# Patient Record
Sex: MALE | URBAN - METROPOLITAN AREA
[De-identification: names, ages, dates, MRNs, and addresses within clinical notes are randomized per-mention and may not be internally consistent; named-entity substitution may affect disease eponyms.]

---

## 2021-09-02 ENCOUNTER — OFFICE VISIT (OUTPATIENT)
Dept: ORTHOPEDIC SURGERY | Age: 31
End: 2021-09-02

## 2021-09-02 VITALS
HEART RATE: 55 BPM | BODY MASS INDEX: 26.87 KG/M2 | HEIGHT: 72 IN | OXYGEN SATURATION: 96 % | WEIGHT: 198.4 LBS | RESPIRATION RATE: 17 BRPM

## 2021-09-02 DIAGNOSIS — M99.08 RIB CAGE REGION SOMATIC DYSFUNCTION: ICD-10-CM

## 2021-09-02 DIAGNOSIS — M99.03 LUMBAR REGION SOMATIC DYSFUNCTION: ICD-10-CM

## 2021-09-02 DIAGNOSIS — M99.05 PELVIC SOMATIC DYSFUNCTION: ICD-10-CM

## 2021-09-02 DIAGNOSIS — M99.04 SACRAL REGION SOMATIC DYSFUNCTION: ICD-10-CM

## 2021-09-02 DIAGNOSIS — M99.01 CERVICAL SOMATIC DYSFUNCTION: ICD-10-CM

## 2021-09-02 DIAGNOSIS — R07.81 RIB PAIN ON LEFT SIDE: Primary | ICD-10-CM

## 2021-09-02 DIAGNOSIS — M99.02 THORACIC REGION SOMATIC DYSFUNCTION: ICD-10-CM

## 2021-09-02 PROCEDURE — 98927 OSTEOPATH MANJ 5-6 REGIONS: CPT | Performed by: FAMILY MEDICINE

## 2021-09-02 PROCEDURE — 99203 OFFICE O/P NEW LOW 30 MIN: CPT | Performed by: FAMILY MEDICINE

## 2021-09-02 NOTE — PATIENT INSTRUCTIONS
Preform home exercises daily. Return if symptoms worsen or fail to improve.   Search YouTube for my channel:    Dr. Eryn Hernández back/Piriformis    Neck/Upper back

## 2021-09-02 NOTE — PROGRESS NOTES
HISTORY OF PRESENT ILLNESS    Za Dillard 1990 is a 33y.o. year old male comes in today as new patient for: left rib pain    Patients symptoms have been present for about a month. Pain level 4/10 posterior left ribs. It has worsened with twisting. Patient has tried:  eval by friend who is [facility] who suspected rib alignment issue and some stretch with mild benefit. It is described as pain started after throwing kids in to pool 50 or so times. History reviewed. No pertinent surgical history. Social History     Socioeconomic History    Marital status: UNKNOWN     Spouse name: Not on file    Number of children: Not on file    Years of education: Not on file    Highest education level: Not on file   Tobacco Use    Smoking status: Never Smoker    Smokeless tobacco: Never Used   Vaping Use    Vaping Use: Never used   Substance and Sexual Activity    Alcohol use: Yes     Alcohol/week: 4.0 standard drinks     Types: 2 Glasses of wine, 2 Cans of beer per week    Drug use: Not Currently     Social Determinants of Health     Financial Resource Strain:     Difficulty of Paying Living Expenses:    Food Insecurity:     Worried About Running Out of Food in the Last Year:     [address] in the Last Year:    Transportation Needs:     Lack of Transportation (Medical):  Lack of Transportation (Non-Medical):    Physical Activity:     Days of Exercise per Week:     Minutes of Exercise per Session:    Stress:     Feeling of Stress :    Social Connections:     Frequency of Communication with Friends and Family:     Frequency of Social Gatherings with Friends and Family:     Attends Religion Services:     Active Member of Clubs or Organizations:     Attends Club or Organization Meetings:     Marital Status:         Past Medical History:   Diagnosis Date    Arthritis     left hip     No family history on file.       ROS:  No SOB, numbness      Objective:  Pulse (!) 55   Resp 17   Ht 6' (1.829 m) Wt 198 lb 6.4 oz (90 kg)   SpO2 96%   BMI 26.91 kg/m²   NEURO:  Sensation intact to light touch. Reflexes +1/4 biceps, triceps, patellar and Achilles bilaterally. M/S:  Examined seated and supine. Slump negative. Standing flexion test positive left  Sphinx test positive left. ASIS low left  Iliac crests equal bilaterally Pubes equal bilaterally Medial malleolus low left  Sacral base posterior left  POONAM low left  TTA at C2 on left \ worse flexion, T4, 5, 7, 8 on left worse flexion and L3 on right worse flexion Rib(s) 5, 6, 7, left TTP and posterior LE Strength +5/5 bilaterally        Assessment/Plan:     ICD-10-CM ICD-9-CM    1. Rib pain on left side  R07.81 786.50    2. Lumbar region somatic dysfunction  M99.03 739.3 GA OSTEOPATHIC MANIP,5-6 BODY REGN   3. Pelvic somatic dysfunction  M99.05 739.5 GA OSTEOPATHIC MANIP,5-6 BODY REGN   4. Sacral region somatic dysfunction  M99.04 739.4 GA OSTEOPATHIC MANIP,5-6 BODY REGN   5. Cervical somatic dysfunction  M99.01 739.1 GA OSTEOPATHIC MANIP,5-6 BODY REGN   6. Rib cage region somatic dysfunction  M99.08 739.8 GA OSTEOPATHIC MANIP,5-6 BODY REGN   7. Thoracic region somatic dysfunction  M99.02 739.2 GA OSTEOPATHIC MANIP,5-6 BODY REGN       Patient (or guardian if minor) verbalizes understanding of evaluation and plan. Verbal consent obtained. Cervical, Thoracic, Rib, Lumbar, Pelvic and Sacral SD treated with ME and HVLA. Correction of previous malalignments verified after Tx. Pt tolerated well. Notes improvement of Sx and pain is now rated 0/10. HEP/stretches daily. Discussed stretching as demo and RTC as needed.

## 2024-03-15 ENCOUNTER — OFFICE VISIT (OUTPATIENT)
Age: 34
End: 2024-03-15
Payer: OTHER GOVERNMENT

## 2024-03-15 VITALS — HEIGHT: 72 IN | WEIGHT: 191 LBS | BODY MASS INDEX: 25.87 KG/M2

## 2024-03-15 DIAGNOSIS — M65.832 EXTENSOR TENOSYNOVITIS OF LEFT WRIST: ICD-10-CM

## 2024-03-15 DIAGNOSIS — M25.532 LEFT WRIST PAIN: ICD-10-CM

## 2024-03-15 DIAGNOSIS — M67.432 GANGLION CYST OF DORSUM OF LEFT WRIST: Primary | ICD-10-CM

## 2024-03-15 PROCEDURE — 99213 OFFICE O/P EST LOW 20 MIN: CPT | Performed by: ORTHOPAEDIC SURGERY

## 2024-03-15 PROCEDURE — 73110 X-RAY EXAM OF WRIST: CPT | Performed by: ORTHOPAEDIC SURGERY

## 2024-03-15 RX ORDER — ASCORBIC ACID 100 MG
TABLET,CHEWABLE ORAL
COMMUNITY

## 2024-03-15 NOTE — PROGRESS NOTES
the dorsal aspect of the left wrist.  This is nonspecific and may represent an intra-articular ganglion versus other cause of inflammation.    Tenderness L R Test L R   1st Ext Comp - - Finkelstein's - -   Snuff Box - - Brown - -   2nd Ext Comp - - S-L Shear - -   S-L Joint - - L-T Shear - -   L-T Joint - - DRUJ Sup - -   6th Ext Comp - - DRUJ Pro - -   Ulnar Snuff - - DRUJ Grind - -   Fovea - - TFCC - -   STT Joint - - Mid-Carp Inst - -   FCR - - P-T Grind - -   Intersection - - ECU Sublux. - -      Dorsal Ganglion: ? left   Volar Ganglion: -      ROM: Full        Imaging:     3/15/2024 3 views of left wrist does not show any fracture, dislocation, or any other acute or chronic osseous abnormalities.      Impression     Diagnosis Orders   1. Ganglion cyst of dorsum of left wrist  MRI WRIST LEFT WO CONTRAST      2. Extensor tenosynovitis of left wrist  MRI WRIST LEFT WO CONTRAST      3. Left wrist pain  [11598] Wrist 3V    MRI WRIST LEFT WO CONTRAST            Plan:     MRI of left wrist without contrast to evaluate for intra-articular ganglion cyst versus other cause of inflammation in the wrist or even extensor tenosynovitis.    Return for MRI Review.     Plan was reviewed with patient, who verbalized agreement and understanding of the plan    Note: This note was completed using voice recognition software.  Any typographical/name errors or mistakes are unintentional.

## 2024-04-12 ENCOUNTER — HOSPITAL ENCOUNTER (OUTPATIENT)
Facility: HOSPITAL | Age: 34
Discharge: HOME | End: 2024-04-12
Attending: ORTHOPAEDIC SURGERY
Payer: OTHER GOVERNMENT

## 2024-04-12 DIAGNOSIS — M67.432 GANGLION CYST OF DORSUM OF LEFT WRIST: ICD-10-CM

## 2024-04-12 DIAGNOSIS — M65.832 EXTENSOR TENOSYNOVITIS OF LEFT WRIST: ICD-10-CM

## 2024-04-12 DIAGNOSIS — M25.532 LEFT WRIST PAIN: ICD-10-CM

## 2024-04-12 PROCEDURE — 73221 MRI JOINT UPR EXTREM W/O DYE: CPT

## 2024-05-10 ENCOUNTER — OFFICE VISIT (OUTPATIENT)
Age: 34
End: 2024-05-10
Payer: OTHER GOVERNMENT

## 2024-05-10 VITALS — HEIGHT: 72 IN | BODY MASS INDEX: 26.01 KG/M2 | WEIGHT: 192 LBS

## 2024-05-10 DIAGNOSIS — M67.432 GANGLION CYST OF DORSUM OF LEFT WRIST: Primary | ICD-10-CM

## 2024-05-10 DIAGNOSIS — M65.832 EXTENSOR TENOSYNOVITIS OF LEFT WRIST: ICD-10-CM

## 2024-05-10 PROCEDURE — 99214 OFFICE O/P EST MOD 30 MIN: CPT | Performed by: ORTHOPAEDIC SURGERY

## 2024-05-10 NOTE — PROGRESS NOTES
Luis Albarado is a 33 y.o. male right handed [employer] .  Worker's Compensation and legal considerations: none    Chief Complaint   Patient presents with    Wrist Pain     Left       Pain Score:   0 - No pain    HPI: Patient presents today for follow-up of left wrist MRI.  He reports his symptoms to be unchanged since previous visit.  He is concerned about proceeding with surgery at this time given the fact that he has a PT test that is to be done by October.    Initial HPI: Patient presents today with complaints of left wrist pain.  He localizes it to the dorsum of the wrist.    Date of onset: Chronic  Injury: No  Prior Treatment:  No    ROS: Review of Systems - General ROS: negative except HPI    Past Medical History:   Diagnosis Date    Arthritis     left hip       History reviewed. No pertinent surgical history.     Current Outpatient Medications   Medication Sig Dispense Refill    Ascorbic Acid (VITAMIN C) 100 MG CHEW Vitamin C      Docusate Calcium (STOOL SOFTENER PO) Stool Softener      Ferrous Sulfate (IRON) 28 MG TABS Take 1 tablet by mouth Every Day       No current facility-administered medications for this visit.       No Known Allergies      Ht 1.829 m (6')   Wt 87.1 kg (192 lb)   BMI 26.04 kg/m²   Physical Exam  Constitutional:       General: He is not in acute distress.     Appearance: Normal appearance. He is not ill-appearing.   Cardiovascular:      Pulses: Normal pulses.   Pulmonary:      Effort: Pulmonary effort is normal. No respiratory distress.   Abdominal:      General: Abdomen is flat. There is no distension.   Musculoskeletal:         General: Tenderness present. No swelling, deformity or signs of injury. Normal range of motion.      Cervical back: Normal range of motion and neck supple.      Right lower leg: No edema.      Left lower leg: No edema.   Skin:     General: Skin is warm and dry.      Capillary Refill: Capillary refill takes less than 2 seconds.      Findings: No bruising or

## 2024-09-06 ENCOUNTER — OFFICE VISIT (OUTPATIENT)
Age: 34
End: 2024-09-06
Payer: OTHER GOVERNMENT

## 2024-09-06 VITALS
BODY MASS INDEX: 26.93 KG/M2 | WEIGHT: 198.8 LBS | SYSTOLIC BLOOD PRESSURE: 122 MMHG | DIASTOLIC BLOOD PRESSURE: 79 MMHG | HEIGHT: 72 IN

## 2024-09-06 DIAGNOSIS — M67.432 GANGLION CYST OF DORSUM OF LEFT WRIST: Primary | ICD-10-CM

## 2024-09-06 PROCEDURE — 99214 OFFICE O/P EST MOD 30 MIN: CPT | Performed by: ORTHOPAEDIC SURGERY

## 2024-09-06 NOTE — PROGRESS NOTES
Eyes:      Extraocular Movements: Extraocular movements intact.      Pupils: Pupils are equal, round, and reactive to light.   Cardiovascular:      Pulses: Normal pulses.   Pulmonary:      Effort: Pulmonary effort is normal. No respiratory distress.   Abdominal:      General: Abdomen is flat. There is no distension.   Musculoskeletal:         General: No swelling, tenderness, deformity or signs of injury. Normal range of motion.      Cervical back: Normal range of motion and neck supple.      Right lower leg: No edema.      Left lower leg: No edema.   Skin:     General: Skin is warm and dry.      Capillary Refill: Capillary refill takes less than 2 seconds.      Findings: No bruising or erythema.   Neurological:      General: No focal deficit present.      Mental Status: He is alert and oriented to person, place, and time.   Psychiatric:         Mood and Affect: Mood normal.         Behavior: Behavior normal.          Wrist: nontender today    Tenderness L R Test L R   1st Ext Comp - - Finkelstein's - -   Snuff Box - - Brown - -   2nd Ext Comp - - S-L Shear - -   S-L Joint - - L-T Shear - -   L-T Joint - - DRUJ Sup - -   6th Ext Comp - - DRUJ Pro - -   Ulnar Snuff - - DRUJ Grind - -   Fovea - - TFCC - -   STT Joint - - Mid-Carp Inst - -   FCR - - P-T Grind - -   Intersection - - ECU Sublux. - -      Dorsal Ganglion: 1cm left   Volar Ganglion: -      ROM: Full        Imaging:     MRI WRIST LEFT WO CONTRAST 04/12/2024  Impression  1. Dorsal ganglion cyst at the articulation of the scapholunate and capitate  measuring 5 x 4 x 6 mm.  2. Minimal second extensor compartment tenosynovitis.  3. Question minimal tendinosis of the first extensor and 6 extensor compartments  and of the flexor digitorum.      3/15/2024 3 views of left wrist does not show any fracture, dislocation, or any other acute or chronic osseous abnormalities.      Impression     Diagnosis Orders   1. Ganglion cyst of dorsum of left wrist  SCHEDULE

## 2024-10-23 ENCOUNTER — HOSPITAL ENCOUNTER (OUTPATIENT)
Facility: HOSPITAL | Age: 34
Setting detail: SPECIMEN
Discharge: HOME | End: 2024-10-26
Payer: OTHER GOVERNMENT

## 2024-10-23 DIAGNOSIS — Z98.890 S/P EXCISION OF GANGLION CYST: ICD-10-CM

## 2024-10-23 DIAGNOSIS — M67.432 GANGLION CYST OF DORSUM OF LEFT WRIST: Primary | ICD-10-CM

## 2024-10-23 PROCEDURE — 88304 TISSUE EXAM BY PATHOLOGIST: CPT

## 2024-10-23 RX ORDER — DICLOFENAC SODIUM 75 MG/1
75 TABLET, DELAYED RELEASE ORAL EVERY 12 HOURS PRN
Qty: 20 TABLET | Refills: 0 | Status: SHIPPED | OUTPATIENT
Start: 2024-10-23 | End: 2024-11-02

## 2024-10-23 RX ORDER — HYDROCODONE BITARTRATE AND ACETAMINOPHEN 5; 325 MG/1; MG/1
1 TABLET ORAL EVERY 6 HOURS PRN
Qty: 12 TABLET | Refills: 0 | Status: SHIPPED | OUTPATIENT
Start: 2024-10-23 | End: 2024-10-26

## 2024-11-01 ENCOUNTER — OFFICE VISIT (OUTPATIENT)
Age: 34
End: 2024-11-01

## 2024-11-01 VITALS — HEIGHT: 72 IN | WEIGHT: 198 LBS | BODY MASS INDEX: 26.82 KG/M2

## 2024-11-01 DIAGNOSIS — Z98.890 POST-OPERATIVE STATE: ICD-10-CM

## 2024-11-01 DIAGNOSIS — M67.432 GANGLION CYST OF DORSUM OF LEFT WRIST: Primary | ICD-10-CM

## 2024-11-01 DIAGNOSIS — Z98.890 S/P EXCISION OF GANGLION CYST: ICD-10-CM

## 2024-11-01 PROCEDURE — 99024 POSTOP FOLLOW-UP VISIT: CPT

## 2024-11-01 NOTE — PROGRESS NOTES
FINAL DIAGNOSIS:     LEFT WRIST GANGLION CYST, EXCISION:        GANGLION CYST.     Impression     Diagnosis Orders   1. Ganglion cyst of dorsum of left wrist        2. S/P excision of ganglion cyst        3. Post-operative state                  Plan:     Wound care reviewed - patient is to wash and dry the incisional area(s) with plain, antibacterial soap and water at least twice daily and pat dry with a clean paper towel.     Cover with a band-aid as needed if out of the house or doing potentially dirty tasks. Ok to leave open to air while at home resting.    Once incision appears more healed, begin using scar cream or cocoa butter to massage directly into the scar to combat scar tissue formation.    If the wound appears red, hot, begins to drain or smell bad, please call the office or seek care at urgent care/ED.    Continue to work on range of motion exercises.    Activities as tolerated    Return if symptoms worsen or fail to improve.     Plan was reviewed with patient, who verbalized agreement and understanding of the plan    Note: This note was completed using voice recognition software.  Any typographical/name errors or mistakes are unintentional.

## 2025-03-14 ENCOUNTER — OFFICE VISIT (OUTPATIENT)
Age: 35
End: 2025-03-14

## 2025-03-14 VITALS — WEIGHT: 198 LBS | HEIGHT: 72 IN | BODY MASS INDEX: 26.82 KG/M2

## 2025-03-14 DIAGNOSIS — M24.632: Primary | ICD-10-CM

## 2025-03-14 DIAGNOSIS — Z98.890 H/O EXCISION OF GANGLION CYST: ICD-10-CM

## 2025-03-14 NOTE — PROGRESS NOTES
Luis Albraado is a 34 y.o. male right handed [employer] .  Worker's Compensation and legal considerations: none    Chief Complaint   Patient presents with    Wrist Pain     Left wrist pain      Pain Score:   5    3/14/2025 HPI:  Patient presents today with complaints of left wrist pain.  He is approximately 5 months status post dorsal ganglion cyst excision.  He reports it only to occur with extreme flexion or extension of the wrist.    24 HPI: Patient presents today for a post operative visit approximately 1.5 weeks s/p left wrist dorsal ganglion cyst on 10/23/2024 with Dr. Beckham. Today, the patient reports pain with carrying things as well as terminal extension.    2024 HPI: Patient presents today for follow-up due to mass over the back of his left wrist interested in mass excision.  He has completed his PT test and is ready to set up surgery.  He does report he has a  coming in March.  He would like to get it done sooner than later.    5/10/2024 HPI: Patient presents today for follow-up of left wrist MRI.  He reports his symptoms to be unchanged since previous visit.  He is concerned about proceeding with surgery at this time given the fact that he has a PT test that is to be done by October.    Initial HPI: Patient presents today with complaints of left wrist pain.  He localizes it to the dorsum of the wrist.    Date of onset: Chronic  Injury: No  Prior Treatment:  No    ROS: Review of Systems - General ROS: negative except HPI    Past Medical History:   Diagnosis Date    Arthritis     left hip       History reviewed. No pertinent surgical history.     Current Outpatient Medications   Medication Sig Dispense Refill    Ascorbic Acid (VITAMIN C) 100 MG CHEW Vitamin C      Docusate Calcium (STOOL SOFTENER PO) Stool Softener      Ferrous Sulfate (IRON) 28 MG TABS Take 1 tablet by mouth Every Day      diclofenac (VOLTAREN) 75 MG EC tablet Take 1 tablet by mouth every 12 hours as needed for Pain 20

## 2025-04-10 ENCOUNTER — OFFICE VISIT (OUTPATIENT)
Age: 35
End: 2025-04-10

## 2025-04-10 ENCOUNTER — HOSPITAL ENCOUNTER (OUTPATIENT)
Facility: HOSPITAL | Age: 35
Discharge: HOME | End: 2025-04-13

## 2025-04-10 VITALS — WEIGHT: 204 LBS | BODY MASS INDEX: 27.67 KG/M2

## 2025-04-10 DIAGNOSIS — M99.01 CERVICAL SOMATIC DYSFUNCTION: ICD-10-CM

## 2025-04-10 DIAGNOSIS — M99.09 SOMATIC DYSFUNCTION OF ABDOMINAL REGION: ICD-10-CM

## 2025-04-10 DIAGNOSIS — M77.51 TENDINITIS OF RIGHT ANKLE: ICD-10-CM

## 2025-04-10 DIAGNOSIS — M77.51 TENDINITIS OF RIGHT ANKLE: Primary | ICD-10-CM

## 2025-04-10 DIAGNOSIS — M99.02 THORACIC REGION SOMATIC DYSFUNCTION: ICD-10-CM

## 2025-04-10 DIAGNOSIS — M99.05 PELVIC REGION SOMATIC DYSFUNCTION: ICD-10-CM

## 2025-04-10 DIAGNOSIS — M99.07 UPPER EXTREMITY SOMATIC DYSFUNCTION: ICD-10-CM

## 2025-04-10 DIAGNOSIS — M99.08 RIB CAGE REGION SOMATIC DYSFUNCTION: ICD-10-CM

## 2025-04-10 DIAGNOSIS — M99.03 SOMATIC DYSFUNCTION OF LUMBAR REGION: ICD-10-CM

## 2025-04-10 DIAGNOSIS — M99.04 SACRAL REGION SOMATIC DYSFUNCTION: ICD-10-CM

## 2025-04-10 DIAGNOSIS — M99.06 LOWER LIMB REGION SOMATIC DYSFUNCTION: ICD-10-CM

## 2025-04-10 NOTE — PROGRESS NOTES
HISTORY OF PRESENT ILLNESS    Luis Albarado 1990 is a 34 y.o. year old male comes in today as new patient for: right ankle - chronic    Patients ankle symptoms have been present for ~1 year.  Pain level 0 - No pain/10. It has worsened with squat anterior.  Patient has tried:  rest, ibuprofen PRN.  It is described as pain started after tug of war in sand.    IMAGING: XR right ankle pending    No past surgical history on file.  Social History     Socioeconomic History    Marital status: Unknown   Tobacco Use    Smoking status: Never    Smokeless tobacco: Never   Substance and Sexual Activity    Alcohol use: Yes     Alcohol/week: 4.0 standard drinks of alcohol    Drug use: Not Currently      Current Outpatient Medications   Medication Sig Dispense Refill    diclofenac (VOLTAREN) 75 MG EC tablet Take 1 tablet by mouth every 12 hours as needed for Pain 20 tablet 0    Ascorbic Acid (VITAMIN C) 100 MG CHEW Vitamin C      Docusate Calcium (STOOL SOFTENER PO) Stool Softener      Ferrous Sulfate (IRON) 28 MG TABS Take 1 tablet by mouth Every Day       No current facility-administered medications for this visit.     Past Medical History:   Diagnosis Date    Arthritis     left hip     No family history on file.      ROS:  No swell. No numb.      Objective:  Wt 92.5 kg (204 lb)   BMI 27.67 kg/m²   NEURO:  Sensation intact light touch B/L lower extremities. Reflexes +2/4 patellar and Achilles bilaterally.  MS:  Strength normal throughout upper and lower extremities bilateral.   right ankle/foot:  Positive tenderness at dorsal tendons.  Positive for tenderness at malleoli.  Anterior drawer test negative.  Talar tilt negative.  Kleiger test negative.  Syndesmosis squeeze negative.  negative fibular head tenderness.  Fibular head motion normal. Gait normal.  ROM normal.  Examined seated and supine.  Slump negative. Standing flexion test positive left  Sphinx test positive left.  ASIS low left  Iliac crests equal bilaterally Pubes

## 2025-04-10 NOTE — PATIENT INSTRUCTIONS
Seated pull toes from top off foot away from shin.  Should feel stretch on top of foot/front of ankle. 30 seconds each time, 4-6 times per day.  Definitely before bed.        Do 4-6 times a day and hold 30 seconds each time. Definitely after exercise/walking and before bed.

## 2025-04-30 ENCOUNTER — HOSPITAL ENCOUNTER (OUTPATIENT)
Facility: HOSPITAL | Age: 35
Setting detail: RECURRING SERIES
Discharge: HOME | End: 2025-05-03
Attending: FAMILY MEDICINE
Payer: OTHER GOVERNMENT

## 2025-04-30 PROCEDURE — 97140 MANUAL THERAPY 1/> REGIONS: CPT

## 2025-04-30 PROCEDURE — 97110 THERAPEUTIC EXERCISES: CPT

## 2025-04-30 PROCEDURE — 97161 PT EVAL LOW COMPLEX 20 MIN: CPT

## 2025-04-30 PROCEDURE — 97535 SELF CARE MNGMENT TRAINING: CPT

## 2025-04-30 NOTE — PROGRESS NOTES
[facility] PH[facility]ICAL THERAP[facility]  [address] [place] [address] Phone: [phone] Fax   Plan of Care / Statement of Necessity for Physical Therapy Services     Patient Name: Luis Albarado : 1990   Medical   Diagnosis: Tendinitis of right ankle Treatment Diagnosis: M25.571  RIGHT ANKLE PAIN       Onset Date: 2024 Payor :  Payor:  EAST / Plan:  EAST SELECT / Product Type: *No Product type* /    Referral Source: Dariel Guevara DO Start of Care (SOC): 2025   Prior Hospitalization: See medical history Provider #: [id number]   Prior Level of Function: Independent with ADLs, functional, and daily/recreational activities.    Comorbidities: Musculoskeletal disorders and Other: arthritis, hx ankle injuries from sports     Assessment / key information:    Pt is a 34 year old male who presents to therapy today with right ankle pain. Pt states that his symptoms began in 2024 when he felt pain during tug of war competition. Pt reports his pain is better overall but has pain with squatting/bending. He states he feels his ankle will give out with end range squatting with report of limited DF movement/ROM. Pt demonstrated decreased AROM, decreased joint mobility at the right TC joint, and impaired squat mechanics due to pain. Pt would benefit from skilled physical therapy to improve the above impairments to help the pt restore function and return to performing ADLs, functional, daily, and recreational activities.     Evaluation Complexity:  History:  MEDIUM  Complexity : 1-2 comorbidities / personal factors will impact the outcome/ POC ; Examination:  MEDIUM Complexity : 3 Standardized tests and measures addressin body structure, function, activity limitation and / or participation in recreation  ;Presentation:  LOW Complexity : Stable, uncomplicated  ;Clinical Decision Making:  Lower Extremity Functional Scale (LEFS) = 68/80 points ; (61 - 80

## 2025-04-30 NOTE — PROGRESS NOTES
PHYSICAL / OCCUPATIONAL THERAPY - DAILY TREATMENT NOTE    Patient Name: Luis Albarado    Date: 2025    : 1990  Insurance: Payor:  EAST / Plan:  EAST SELECT / Product Type: *No Product type* /      Patient  verified Yes     Visit #   Current / Total 1 10   Time   In / Out 1:03 1:46   Pain   In / Out 0 0   Subjective Functional Status/Changes: See POC     TREATMENT AREA =  Tendinitis of right ankle    OBJECTIVE    13 min [x]Eval        X untimed      Therapeutic Procedures:    Tx Min Billable or 1:1 Min (if diff from Tx Min) Procedure, Rationale, Specifics   10  36531 Therapeutic Exercise (timed):  increase ROM, strength, coordination, balance, and proprioception to improve patient's ability to progress to PLOF and address remaining functional goals. (see flow sheet as applicable)     Details if applicable:  HEP instruction/demonstration     10  41761 Manual Therapy (timed):  decrease pain, increase ROM, increase tissue extensibility, and increase postural awareness to improve patient's ability to progress to PLOF and address remaining functional goals.  The manual therapy interventions were performed at a separate and distinct time from the therapeutic activities interventions . (see flow sheet as applicable)     Details if applicable:  In reclined: grade 3 right distal tibfib mobs to improve DF, grade right 3 and 5 TC mobs to improve DF, right subtalor distraction to improve DF   10  30639 Self Care/Home Management (timed):  improve patient knowledge and understanding of home injury/symptom/pain management, positioning, posture/ergonomics, home safety, activity modification, transfer techniques, and joint protection strategies  to improve patient's ability to progress to PLOF and address remaining functional goals.  (see flow sheet as applicable)     Details if applicable:  Pt education on relevant anatomy/physiology, pt education on therapy progression and expectations.     30  [facility]

## 2025-05-08 ENCOUNTER — HOSPITAL ENCOUNTER (OUTPATIENT)
Facility: HOSPITAL | Age: 35
Setting detail: RECURRING SERIES
Discharge: HOME | End: 2025-05-11
Attending: FAMILY MEDICINE
Payer: OTHER GOVERNMENT

## 2025-05-08 PROCEDURE — 97110 THERAPEUTIC EXERCISES: CPT

## 2025-05-08 PROCEDURE — 97140 MANUAL THERAPY 1/> REGIONS: CPT

## 2025-05-08 PROCEDURE — 97016 VASOPNEUMATIC DEVICE THERAPY: CPT

## 2025-05-08 NOTE — PROGRESS NOTES
PHYSICAL / OCCUPATIONAL THERAPY - DAILY TREATMENT NOTE (updated )    Patient Name: Luis Albarado    Date: 2025    : 1990  Insurance: Payor:  EAST / Plan: [employer] EAST SELECT / Product Type: *No Product type* /      Patient  verified YES    Visit #   Current / Total 2 10   Time   In / Out 1023 1119   Pain   In / Out 1 1   Subjective Functional Status/Changes: Denies any adverse effects following last session. Symptoms improving     TREATMENT AREA =  Tendinitis of right ankle  Right ankle pain    Next PN/ RC due 25  Auth due GAVIN    OBJECTIVE    Vasopneumatic Device (UNTIMED), press/temp: medium/34    Involved limb - Pre-Treatment Girth: 26  Post-Treatment Girth: 26  [place]: malleoli     Min Rationale   10 decrease edema, decrease inflammation, and decrease pain to improve patient's ability to progress to PLOF and address remaining functional goals.     Skin assessment post-treatment:   Intact     Therapeutic Procedures:  Tx Min Billable or 1:1 Min (if diff from Tx Min) Procedure, Rationale, Specifics   38  49177 Therapeutic Exercise (timed):  increase ROM, strength, coordination, balance, and proprioception to improve patient's ability to progress to PLOF and address remaining functional goals. (see flow sheet as applicable)     Details if applicable:    See flowsheet     8  31458 Manual Therapy (timed):  decrease pain, increase ROM, and increase tissue extensibility to improve patient's ability to progress to PLOF and address remaining functional goals.  The manual therapy interventions were performed at a separate and distinct time from the therapeutic activities interventions . (see flow sheet as applicable)     Details if applicable:  AP TC Mobilization Grade 3   46  MC BC Totals Reminder: bill using total billable min of TIMED therapeutic procedures (example: do not include dry needle or estim unattended, both untimed codes, in totals to left)  8-22 min = 1 unit; 23-37 min = 2 units;

## 2025-05-22 ENCOUNTER — HOSPITAL ENCOUNTER (OUTPATIENT)
Facility: HOSPITAL | Age: 35
Setting detail: RECURRING SERIES
Discharge: HOME | End: 2025-05-25
Attending: FAMILY MEDICINE
Payer: OTHER GOVERNMENT

## 2025-05-22 PROCEDURE — 97112 NEUROMUSCULAR REEDUCATION: CPT

## 2025-05-22 PROCEDURE — 97110 THERAPEUTIC EXERCISES: CPT

## 2025-05-22 NOTE — PROGRESS NOTES
PHYSICAL / OCCUPATIONAL THERAPY - DAILY TREATMENT NOTE (updated )    Patient Name: Luis Albarado    Date: 2025    : 1990  Insurance: Payor:  EAST / Plan: [employer]AST SELECT / Product Type: *No Product type* /      Patient  verified yes     Visit #   Current / Total 3 10 Total Time   Time   In / Out 2:23 3:06 43   Pain   In / Out 1 1    Subjective Functional Status/Changes: My ankle has been feeling alright.     TREATMENT AREA =  Tendinitis of right ankle  Right ankle pain    OBJECTIVE    Therapeutic Procedures:  43  Total MC BC Totals Reminder: bill using total billable min of TIMED therapeutic procedures (example: do not include dry needle or estim unattended, both untimed codes, in totals to left)  8-22 min = 1 unit; 23-37 min = 2 units; 38-52 min = 3 units; 53-67 min = 4 units; 68-82 min = 5 units   Tx Min Procedure, Rationale, Specifics   16 14908 Therapeutic Exercise (timed):  increase ROM, strength, coordination, balance, and proprioception to improve patient's ability to progress to PLOF and address remaining functional goals. (see flow sheet as applicable)   Details if applicable:       27 41635 Neuromuscular Re-Education (timed):  improve balance, coordination, kinesthetic sense, posture, core stability and proprioception to improve patient's ability to develop conscious control of individual muscles and awareness of position of extremities in order to progress to PLOF and address remaining functional goals. (see flow sheet as applicable)     Details if applicable:           [x]  Patient Education billed concurrently with other procedures   [x] Review HEP    [] Progressed/Changed HEP, detail:    [] Other detail:       Objective Information/Functional Measures/Assessment    - Patient reports no increased pain following most recent follow up session.  - Focus of today's treatment on continuing to progress ankle dorsiflexion with self mobilization with belt.   - The patient requires